# Patient Record
Sex: FEMALE | Race: WHITE | NOT HISPANIC OR LATINO | URBAN - METROPOLITAN AREA
[De-identification: names, ages, dates, MRNs, and addresses within clinical notes are randomized per-mention and may not be internally consistent; named-entity substitution may affect disease eponyms.]

---

## 2017-05-23 ENCOUNTER — IMPORTED ENCOUNTER (OUTPATIENT)
Dept: URBAN - METROPOLITAN AREA CLINIC 1 | Facility: CLINIC | Age: 72
End: 2017-05-23

## 2017-05-23 PROBLEM — H43.813: Noted: 2017-05-23

## 2017-05-23 PROBLEM — H25.813: Noted: 2017-05-23

## 2017-05-23 PROBLEM — H50.00: Noted: 2017-05-23

## 2017-05-23 PROCEDURE — 92014 COMPRE OPH EXAM EST PT 1/>: CPT

## 2017-05-23 NOTE — PATIENT DISCUSSION
1.  Cataract OU: Observe for now without intervention. The patient was advised to contact us if any change or worsening of vision2. PVD OU - RD precautions. 3.  Esotropia OU 4. Return for an appointment in 1 year for 30 with Dr. Elder Bassett.

## 2018-05-22 ENCOUNTER — IMPORTED ENCOUNTER (OUTPATIENT)
Dept: URBAN - METROPOLITAN AREA CLINIC 1 | Facility: CLINIC | Age: 73
End: 2018-05-22

## 2018-05-22 PROBLEM — H25.813: Noted: 2018-05-22

## 2018-05-22 PROBLEM — H43.813: Noted: 2018-05-22

## 2018-05-22 PROBLEM — H50.00: Noted: 2018-05-22

## 2018-05-22 PROCEDURE — 92014 COMPRE OPH EXAM EST PT 1/>: CPT

## 2018-05-22 PROCEDURE — 92015 DETERMINE REFRACTIVE STATE: CPT

## 2018-05-22 NOTE — PATIENT DISCUSSION
1.  Cataract OU: Observe for now without intervention. The patient was advised to contact us if any change or worsening of vision2. PVD OU - RD precautions. 3.  Esotropia OS4. Return for an appointment in 1 year for 30 with Dr. Yenny Gonsalves.

## 2019-03-27 ENCOUNTER — IMPORTED ENCOUNTER (OUTPATIENT)
Dept: URBAN - METROPOLITAN AREA CLINIC 1 | Facility: CLINIC | Age: 74
End: 2019-03-27

## 2019-03-27 PROCEDURE — 92014 COMPRE OPH EXAM EST PT 1/>: CPT

## 2019-03-27 NOTE — PATIENT DISCUSSION
1.  Cataract OS: Observe for now without intervention. The patient was advised to contact us if any change or worsening of vision2. Cataract OD: Visually Significant discussed the risks benefits alternatives and limitations of cataract surgery. The patient stated a full understanding and a desire to proceed with the procedure. The patient will need to return for preop appointment with cataract measurements and to have any additional questions answered and start pre-operative eye drops as directed. Can't see to read sew or drive and needs to see better. Schedule cataract surgery with [facility] PCLOtherwise follow-up3. Esotropia OS:4. Return for an appointment in 1 year for 30. with Dr. Abdelrahman Samaniego. 5. OD is dominant eye but has most dense cataract. Having visual confusion.

## 2019-03-27 NOTE — PATIENT DISCUSSION
1.  Cataract OU: Visually significant due to glare. Discussed the risks benefits alternatives and limitations of cataract surgery. The patient stated a full understanding and a desire to proceed with the procedure. The patient will need to return for preop appointment with cataract measurements and to have any additional questions answered and start pre-operative eye drops as directed. Can't see to read sew or drive and needs to see better. Schedule cataract surgery with Dr. Kasandra Cortes. 2. PVD OU - RD precautions. 3.  Esotropia OS4. Return for A scan H and P with Dr. Jeanette Lim. ( OD is dominant eye but has most dense cataract. Having visual confusion. )

## 2019-04-02 PROBLEM — H25.812: Noted: 2019-03-27

## 2019-04-02 PROBLEM — H25.811: Noted: 2019-03-27

## 2019-04-02 PROBLEM — H43.813: Noted: 2019-04-02

## 2019-04-02 PROBLEM — H50.00: Noted: 2019-03-27

## 2019-04-23 ENCOUNTER — IMPORTED ENCOUNTER (OUTPATIENT)
Dept: URBAN - METROPOLITAN AREA CLINIC 1 | Facility: CLINIC | Age: 74
End: 2019-04-23

## 2019-04-23 PROBLEM — H25.813: Noted: 2019-04-23

## 2019-04-23 PROCEDURE — 92136 OPHTHALMIC BIOMETRY: CPT

## 2019-04-23 NOTE — PATIENT DISCUSSION
1.  Cataract OU:  Visually Significant secondary to glare discussed the risks benefits alternatives and limitations of cataract surgery. The patient stated a full understanding and a desire to proceed with the procedure. Pt understands they will need glasses post-op to achieve their best corrected vision. Phaco PCL OS then OD Phaco PCL OS (Standard lens standard procedure) 2. Hypertensive Retinopathy OU - HTN Control 3. PVD OU 4. Amblyopia OS - w/ Esoptropia OS. H/o patching during childhood Return for an appointment for Return as scheduled with Dr. Amor Ocasio.

## 2019-04-23 NOTE — PATIENT DISCUSSION
Hypertensive Retinopathy OU - HTN Control 3. PVD OU 4. Amblyopia OS - w/ Esoptropia OS. H/o patching during childhood Return for an appointment for Return as scheduled with Dr. Ozzie Kruger.

## 2019-05-01 ENCOUNTER — IMPORTED ENCOUNTER (OUTPATIENT)
Dept: URBAN - METROPOLITAN AREA CLINIC 1 | Facility: CLINIC | Age: 74
End: 2019-05-01

## 2019-05-02 ENCOUNTER — IMPORTED ENCOUNTER (OUTPATIENT)
Dept: URBAN - METROPOLITAN AREA CLINIC 1 | Facility: CLINIC | Age: 74
End: 2019-05-02

## 2019-05-02 PROBLEM — Z96.1: Noted: 2019-05-02

## 2019-05-02 PROCEDURE — 99024 POSTOP FOLLOW-UP VISIT: CPT

## 2019-05-02 NOTE — PATIENT DISCUSSION
POD#1 CE/IOL OS (Standard) doing well. Continue all 3 gtts as prescribed and until gone. Use Inveltys BID OS Acular Qdaily OS Ocuflox TID OS Post op Warnings Reiterated RTC as scheduled

## 2019-05-09 ENCOUNTER — IMPORTED ENCOUNTER (OUTPATIENT)
Dept: URBAN - METROPOLITAN AREA CLINIC 1 | Facility: CLINIC | Age: 74
End: 2019-05-09

## 2019-05-09 PROBLEM — H25.811: Noted: 2019-05-09

## 2019-05-09 PROCEDURE — 92136 OPHTHALMIC BIOMETRY: CPT

## 2019-05-09 NOTE — PATIENT DISCUSSION
1.  Cataract OD: Visually Significant secondary to glare discussed the risks benefits alternatives and limitations of cataract surgery. The patient stated a full understanding and a desire to proceed with the procedure. Pt understands they will need glasses post-op to achieve their best corrected vision. Phaco PCL OD (Standard lens standard procedure)  2. POW#2  CE/IOL OS (Standard) doing well.   Use Inveltys BID OS till out Acular Qdaily OS till out F/u as scheduled 2nd eye

## 2019-05-15 ENCOUNTER — IMPORTED ENCOUNTER (OUTPATIENT)
Dept: URBAN - METROPOLITAN AREA CLINIC 1 | Facility: CLINIC | Age: 74
End: 2019-05-15

## 2019-05-16 ENCOUNTER — IMPORTED ENCOUNTER (OUTPATIENT)
Dept: URBAN - METROPOLITAN AREA CLINIC 1 | Facility: CLINIC | Age: 74
End: 2019-05-16

## 2019-05-16 PROBLEM — Z96.1: Noted: 2019-05-16

## 2019-05-16 PROCEDURE — 99024 POSTOP FOLLOW-UP VISIT: CPT

## 2019-05-16 NOTE — PATIENT DISCUSSION
1. POD#1 Phaco/ PCL OD (Standard)- doing well. Continue all 3 gtts as prescribed and until gone. Use Invelys BID OD Ilevro Qdaily OD Ocuflox TID OD Post op Warnings Reiterated 2. POW#2 Phaco/ PCL OS (Standard) - doing well Continue all 3 gtts as prescribed and until gone. Use Inveltys BID OS till out Use Ilevro Qdaily OS till out Post op Warnings Reiterated RTC as scheduled

## 2019-06-06 ENCOUNTER — IMPORTED ENCOUNTER (OUTPATIENT)
Dept: URBAN - METROPOLITAN AREA CLINIC 1 | Facility: CLINIC | Age: 74
End: 2019-06-06

## 2019-06-06 PROBLEM — Z09: Noted: 2019-06-06

## 2019-06-06 PROCEDURE — 99024 POSTOP FOLLOW-UP VISIT: CPT

## 2019-06-06 NOTE — PATIENT DISCUSSION
POW#3 Phaco/ PCL OU (Standard OU) doing well. H/o Amblyopia OS Finish PO meds per schedule; Use Acular Qdaily OU till supply out. MRX for glasses given Return for an appointment in 4 mo 30 with Dr. Dayanara Carlson.

## 2019-10-25 ENCOUNTER — IMPORTED ENCOUNTER (OUTPATIENT)
Dept: URBAN - METROPOLITAN AREA CLINIC 1 | Facility: CLINIC | Age: 74
End: 2019-10-25

## 2019-10-25 PROBLEM — H04.123: Noted: 2019-10-25

## 2019-10-25 PROBLEM — H26.493: Noted: 2019-10-25

## 2019-10-25 PROBLEM — H35.371: Noted: 2019-10-25

## 2019-10-25 PROBLEM — H43.813: Noted: 2019-10-25

## 2019-10-25 PROBLEM — Z96.1: Noted: 2019-10-25

## 2019-10-25 PROBLEM — H53.002: Noted: 2019-10-25

## 2019-10-25 PROBLEM — H50.00: Noted: 2019-10-25

## 2019-10-25 PROBLEM — H16.143: Noted: 2019-10-25

## 2019-10-25 PROBLEM — H35.033: Noted: 2019-10-25

## 2019-10-25 PROCEDURE — 92014 COMPRE OPH EXAM EST PT 1/>: CPT

## 2019-10-25 NOTE — PATIENT DISCUSSION
1.  Epiretinal Membrane OD - Observe for change. Consider Retinal consultation if vision does not improve post YAG 2.  PCO OU- Visually Significant OD; schedule YAG Cap OD only. Pros cons risks and benefits. Will continue to observe PCO OS. 3.  Pseudophakia OU - (Standard OU) 4. JIMMIE w/ PEK OU- Recommend ATs TID OU routinely 5. GR I Hypertensive Retinopathy OU- Stable continue HTN Control6. PVD OU - RD precautions. 7.  Amblyopia OS - w/ Esotropia OSReturn for an appointment in YAG Cap OD only with Dr. Larry Richardson.

## 2019-12-06 ENCOUNTER — IMPORTED ENCOUNTER (OUTPATIENT)
Dept: URBAN - METROPOLITAN AREA CLINIC 1 | Facility: CLINIC | Age: 74
End: 2019-12-06

## 2019-12-06 PROBLEM — H26.491: Noted: 2019-12-06

## 2019-12-06 PROCEDURE — 66821 AFTER CATARACT LASER SURGERY: CPT

## 2019-12-06 NOTE — PATIENT DISCUSSION
YAG CAP OD: (Consent signed and scanned into attachments) 1 gtt Prolensa applied. The purpose and nature of the procedure possible alternative methods of treatment the risks involved and the possibility of complications were discussed with patient. The Patient wishes to proceed and the consent was signed. The laser was then performed under topical anesthesia with no complications. Post op instructions were given to patient as well as a follow-up appointment. Patient was advised to call our office if any questions or concerns. Return for an appointment in 1 mo YAG PO with Dr. Catrachito Schwarz.

## 2020-01-13 ENCOUNTER — IMPORTED ENCOUNTER (OUTPATIENT)
Dept: URBAN - METROPOLITAN AREA CLINIC 1 | Facility: CLINIC | Age: 75
End: 2020-01-13

## 2020-01-13 PROBLEM — H26.40: Noted: 2020-01-13

## 2020-01-13 PROCEDURE — 99024 POSTOP FOLLOW-UP VISIT: CPT

## 2020-01-13 NOTE — PATIENT DISCUSSION
PO YAG OD: good result d/c NSAID. *Va limited OD by ERM. Glasses Mrx given today. Return for an appointment in Sept for a 27 with Dr. Cyn Tobar.

## 2020-09-15 ENCOUNTER — IMPORTED ENCOUNTER (OUTPATIENT)
Dept: URBAN - METROPOLITAN AREA CLINIC 1 | Facility: CLINIC | Age: 75
End: 2020-09-15

## 2020-09-15 PROBLEM — H35.371: Noted: 2020-09-15

## 2020-09-15 PROBLEM — H04.123: Noted: 2020-09-15

## 2020-09-15 PROBLEM — H26.492: Noted: 2020-09-15

## 2020-09-15 PROBLEM — H16.143: Noted: 2020-09-15

## 2020-09-15 PROCEDURE — 92015 DETERMINE REFRACTIVE STATE: CPT

## 2020-09-15 PROCEDURE — 92014 COMPRE OPH EXAM EST PT 1/>: CPT

## 2020-09-15 NOTE — PATIENT DISCUSSION
1.  Epiretinal Membrane OD - Observe for change. 2. JIMMIE w/ PEK OU-Recommend ATs TID OU3. PCO OS- Visually Significant; schedule YAG Cap. Pros cons risks and benefits. 4.  Pseudophakia OU - (Standard OU) 5. GR I Hypertensive Retinopathy OU- Stable continue HTN Control6. PVD OU - RD precautions. 7.  Amblyopia OS - w/ Esotropia OS Return for an appointment in YAG Cap OS with Dr. Dar Bynum.

## 2020-10-02 ENCOUNTER — IMPORTED ENCOUNTER (OUTPATIENT)
Dept: URBAN - METROPOLITAN AREA CLINIC 1 | Facility: CLINIC | Age: 75
End: 2020-10-02

## 2020-10-02 PROBLEM — H26.492: Noted: 2020-10-02

## 2020-10-02 PROCEDURE — 66821 AFTER CATARACT LASER SURGERY: CPT

## 2020-10-02 NOTE — PATIENT DISCUSSION
YAG CAP OS: (Consent signed and scanned into attachments) 1 gtt Prolensa applied. The purpose and nature of the procedure possible alternative methods of treatment the risks involved and the possibility of complications were discussed with patient. The Patient wishes to proceed and the consent was signed. The laser was then performed under topical anesthesia with no complications. Post op instructions were given to patient as well as a follow-up appointment. Patient was advised to call our office if any questions or concerns. Return for an appointment in 1-4 weeks for PO YAG with Dr. Tobi Reese.

## 2020-10-27 ENCOUNTER — IMPORTED ENCOUNTER (OUTPATIENT)
Dept: URBAN - METROPOLITAN AREA CLINIC 1 | Facility: CLINIC | Age: 75
End: 2020-10-27

## 2020-10-27 PROBLEM — Z09: Noted: 2020-10-27

## 2020-10-27 PROCEDURE — 99024 POSTOP FOLLOW-UP VISIT: CPT

## 2020-10-27 NOTE — PATIENT DISCUSSION
PO YAG Cap OS -- good result. MRX given. *H/o Amblyopia OS. Return for an appointment in September for a 27 with Dr. Kasandra Cortes.

## 2021-04-27 ENCOUNTER — IMPORTED ENCOUNTER (OUTPATIENT)
Dept: URBAN - METROPOLITAN AREA CLINIC 1 | Facility: CLINIC | Age: 76
End: 2021-04-27

## 2021-10-04 ENCOUNTER — IMPORTED ENCOUNTER (OUTPATIENT)
Dept: URBAN - METROPOLITAN AREA CLINIC 1 | Facility: CLINIC | Age: 76
End: 2021-10-04

## 2021-10-04 PROBLEM — H35.373: Noted: 2021-10-04

## 2021-10-04 PROBLEM — H43.813: Noted: 2021-10-04

## 2021-10-04 PROBLEM — H04.123: Noted: 2021-10-04

## 2021-10-04 PROBLEM — H16.143: Noted: 2021-10-04

## 2021-10-04 PROCEDURE — 99214 OFFICE O/P EST MOD 30 MIN: CPT

## 2021-10-04 NOTE — PATIENT DISCUSSION
1.  Epiretinal Membrane OU - OD Stable OS New. Observe for change. 2. PVD OU - No tears holes or detachments. RD precautions. 3.  JIMMIE w/ PEK OU - Continue ATs TID OU4. Pseudophakia OU - (Standard OU) 5. GR I Hypertensive Retinopathy OU- Stable continue HTN Control6. PVD OU - RD precautions. 7.  Amblyopia OS - w/ Esotropia OS Return for an appointment 6 months for a DFE/MAC OCT with Dr. Larry Richardson.

## 2022-04-02 ASSESSMENT — VISUAL ACUITY
OS_GLARE: 20/80
OS_SC: 20/25
OS_CC: J1
OS_CC: 20/30
OD_GLARE: 20/80
OS_CC: J1
OD_CC: J2
OS_CC: 20/25
OS_SC: 20/25
OS_SC: 20/20
OD_CC: J1
OS_CC: 20/25
OS_SC: 20/25
OD_SC: 20/30
OD_CC: J1
OD_GLARE: 20/150
OS_GLARE: 20/150
OS_SC: 20/30
OD_GLARE: 20/150
OD_CC: 20/40
OS_CC: 20/20
OD_SC: 20/30
OD_SC: 20/40-1
OS_SC: 20/20
OS_SC: 20/25
OD_SC: 20/40
OS_CC: J1
OD_SC: 20/30+2
OD_CC: 20/60
OD_SC: 20/30
OD_SC: 20/20
OS_GLARE: 20/150
OS_SC: 20/20
OD_SC: 20/30-2
OS_GLARE: 20/80

## 2022-04-02 ASSESSMENT — KERATOMETRY
OS_K1POWER_DIOPTERS: 42.75
OS_AXISANGLE2_DEGREES: 067
OS_K2POWER_DIOPTERS: 43.00
OD_K1POWER_DIOPTERS: 42.75
OD_K2POWER_DIOPTERS: 43.25
OD_AXISANGLE2_DEGREES: 098
OS_AXISANGLE_DEGREES: 157
OD_AXISANGLE_DEGREES: 008

## 2022-04-02 ASSESSMENT — TONOMETRY
OS_IOP_MMHG: 13
OS_IOP_MMHG: 14
OS_IOP_MMHG: 15
OD_IOP_MMHG: 14
OD_IOP_MMHG: 13
OS_IOP_MMHG: 15
OD_IOP_MMHG: 16
OS_IOP_MMHG: 16
OS_IOP_MMHG: 16
OS_IOP_MMHG: 15
OD_IOP_MMHG: 15
OD_IOP_MMHG: 15
OS_IOP_MMHG: 14
OD_IOP_MMHG: 14
OD_IOP_MMHG: 16
OS_IOP_MMHG: 15
OS_IOP_MMHG: 13
OD_IOP_MMHG: 13
OD_IOP_MMHG: 13
OS_IOP_MMHG: 15
OD_IOP_MMHG: 16
OD_IOP_MMHG: 15

## 2022-04-04 ENCOUNTER — COMPREHENSIVE EXAM (OUTPATIENT)
Dept: URBAN - METROPOLITAN AREA CLINIC 1 | Facility: CLINIC | Age: 77
End: 2022-04-04

## 2022-04-04 DIAGNOSIS — H35.373: ICD-10-CM

## 2022-04-04 DIAGNOSIS — H43.813: ICD-10-CM

## 2022-04-04 DIAGNOSIS — H16.143: ICD-10-CM

## 2022-04-04 DIAGNOSIS — Z96.1: ICD-10-CM

## 2022-04-04 DIAGNOSIS — H04.123: ICD-10-CM

## 2022-04-04 DIAGNOSIS — H35.033: ICD-10-CM

## 2022-04-04 PROCEDURE — 99214 OFFICE O/P EST MOD 30 MIN: CPT

## 2022-04-04 PROCEDURE — 92134 CPTRZ OPH DX IMG PST SGM RTA: CPT

## 2022-04-04 ASSESSMENT — VISUAL ACUITY
OS_CC: 20/25
OS_CC: J1
OD_CC: J1
OD_CC: 20/25 +2

## 2022-04-04 ASSESSMENT — TONOMETRY
OS_IOP_MMHG: 12
OD_IOP_MMHG: 12

## 2022-10-04 ENCOUNTER — COMPREHENSIVE EXAM (OUTPATIENT)
Dept: URBAN - METROPOLITAN AREA CLINIC 1 | Facility: CLINIC | Age: 77
End: 2022-10-04

## 2022-10-04 DIAGNOSIS — H35.373: ICD-10-CM

## 2022-10-04 DIAGNOSIS — H53.022: ICD-10-CM

## 2022-10-04 PROCEDURE — 99214 OFFICE O/P EST MOD 30 MIN: CPT

## 2022-10-04 PROCEDURE — 92015 DETERMINE REFRACTIVE STATE: CPT

## 2022-10-04 ASSESSMENT — TONOMETRY
OS_IOP_MMHG: 12
OD_IOP_MMHG: 12

## 2022-10-04 ASSESSMENT — VISUAL ACUITY
OS_CC: 20/25-2
OD_CC: J1
OS_CC: J1
OD_CC: 20/30

## 2023-11-06 ENCOUNTER — COMPREHENSIVE EXAM (OUTPATIENT)
Dept: URBAN - METROPOLITAN AREA CLINIC 1 | Facility: CLINIC | Age: 78
End: 2023-11-06

## 2023-11-06 DIAGNOSIS — H35.373: ICD-10-CM

## 2023-11-06 DIAGNOSIS — H40.013: ICD-10-CM

## 2023-11-06 DIAGNOSIS — H35.033: ICD-10-CM

## 2023-11-06 DIAGNOSIS — H04.123: ICD-10-CM

## 2023-11-06 PROCEDURE — 99214 OFFICE O/P EST MOD 30 MIN: CPT

## 2023-11-06 ASSESSMENT — VISUAL ACUITY
OD_CC: J1
OS_CC: 20/25
OS_CC: J1
OD_CC: 20/30

## 2023-11-06 ASSESSMENT — TONOMETRY
OS_IOP_MMHG: 13
OD_IOP_MMHG: 13

## 2024-11-08 ENCOUNTER — COMPREHENSIVE EXAM (OUTPATIENT)
Dept: URBAN - METROPOLITAN AREA CLINIC 1 | Facility: CLINIC | Age: 79
End: 2024-11-08

## 2024-11-08 DIAGNOSIS — H35.033: ICD-10-CM

## 2024-11-08 DIAGNOSIS — H40.013: ICD-10-CM

## 2024-11-08 DIAGNOSIS — H53.022: ICD-10-CM

## 2024-11-08 DIAGNOSIS — Z96.1: ICD-10-CM

## 2024-11-08 DIAGNOSIS — H04.123: ICD-10-CM

## 2024-11-08 DIAGNOSIS — H35.373: ICD-10-CM

## 2024-11-08 DIAGNOSIS — H43.813: ICD-10-CM

## 2024-11-08 DIAGNOSIS — H16.143: ICD-10-CM

## 2024-11-08 PROCEDURE — 92134 CPTRZ OPH DX IMG PST SGM RTA: CPT

## 2024-11-08 PROCEDURE — 99214 OFFICE O/P EST MOD 30 MIN: CPT

## 2024-11-08 PROCEDURE — 92015 DETERMINE REFRACTIVE STATE: CPT
